# Patient Record
Sex: MALE | Race: BLACK OR AFRICAN AMERICAN
[De-identification: names, ages, dates, MRNs, and addresses within clinical notes are randomized per-mention and may not be internally consistent; named-entity substitution may affect disease eponyms.]

---

## 2019-02-25 ENCOUNTER — HOSPITAL ENCOUNTER (EMERGENCY)
Dept: HOSPITAL 35 - ER | Age: 64
Discharge: HOME | End: 2019-02-25
Payer: COMMERCIAL

## 2019-02-25 VITALS — SYSTOLIC BLOOD PRESSURE: 121 MMHG | DIASTOLIC BLOOD PRESSURE: 74 MMHG

## 2019-02-25 VITALS — BODY MASS INDEX: 31.83 KG/M2 | HEIGHT: 68 IN | WEIGHT: 210.01 LBS

## 2019-02-25 DIAGNOSIS — F03.90: ICD-10-CM

## 2019-02-25 DIAGNOSIS — F25.9: ICD-10-CM

## 2019-02-25 DIAGNOSIS — R25.1: Primary | ICD-10-CM

## 2019-02-25 DIAGNOSIS — F31.9: ICD-10-CM

## 2019-02-25 DIAGNOSIS — F17.210: ICD-10-CM

## 2019-02-25 LAB
ALBUMIN SERPL-MCNC: 3.9 G/DL (ref 3.4–5)
ALT SERPL-CCNC: 24 U/L (ref 30–65)
ANION GAP SERPL CALC-SCNC: 5 MMOL/L (ref 7–16)
AST SERPL-CCNC: 16 U/L (ref 15–37)
BASOPHILS NFR BLD AUTO: 0.8 % (ref 0–2)
BILIRUB SERPL-MCNC: 0.6 MG/DL
BILIRUB UR-MCNC: NEGATIVE MG/DL
BUN SERPL-MCNC: 21 MG/DL (ref 7–18)
CALCIUM SERPL-MCNC: 10.6 MG/DL (ref 8.5–10.1)
CHLORIDE SERPL-SCNC: 102 MMOL/L (ref 98–107)
CO2 SERPL-SCNC: 29 MMOL/L (ref 21–32)
COLOR UR: YELLOW
CREAT SERPL-MCNC: 1.8 MG/DL (ref 0.7–1.3)
EOSINOPHIL NFR BLD: 1.2 % (ref 0–3)
ERYTHROCYTE [DISTWIDTH] IN BLOOD BY AUTOMATED COUNT: 15.7 % (ref 10.5–14.5)
GLUCOSE SERPL-MCNC: 100 MG/DL (ref 74–106)
GRANULOCYTES NFR BLD MANUAL: 73 % (ref 36–66)
HCT VFR BLD CALC: 46.9 % (ref 42–52)
HGB BLD-MCNC: 14.9 GM/DL (ref 14–18)
KETONES UR STRIP-MCNC: NEGATIVE MG/DL
LYMPHOCYTES NFR BLD AUTO: 18.4 % (ref 24–44)
MCH RBC QN AUTO: 28.6 PG (ref 26–34)
MCHC RBC AUTO-ENTMCNC: 31.7 G/DL (ref 28–37)
MCV RBC: 90.1 FL (ref 80–100)
MONOCYTES NFR BLD: 6.6 % (ref 1–8)
NEUTROPHILS # BLD: 6.2 THOU/UL (ref 1.4–8.2)
PLATELET # BLD: 203 THOU/UL (ref 150–400)
POTASSIUM SERPL-SCNC: 4.3 MMOL/L (ref 3.5–5.1)
PROT SERPL-MCNC: 8.1 G/DL (ref 6.4–8.2)
RBC # BLD AUTO: 5.21 MIL/UL (ref 4.5–6)
RBC # UR STRIP: NEGATIVE /UL
SODIUM SERPL-SCNC: 136 MMOL/L (ref 136–145)
SP GR UR STRIP: 1.01 (ref 1–1.03)
TROPONIN I SERPL-MCNC: <0.06 NG/ML (ref ?–0.06)
URINE CLARITY: CLEAR
URINE GLUCOSE-RANDOM*: NEGATIVE
URINE LEUKOCYTES-REFLEX: NEGATIVE
URINE NITRITE-REFLEX: NEGATIVE
URINE PROTEIN (DIPSTICK): NEGATIVE
UROBILINOGEN UR STRIP-ACNC: 0.2 E.U./DL (ref 0.2–1)
WBC # BLD AUTO: 8.4 THOU/UL (ref 4–11)

## 2021-06-30 ENCOUNTER — HOSPITAL ENCOUNTER (EMERGENCY)
Dept: HOSPITAL 35 - ER | Age: 66
LOS: 1 days | Discharge: HOME | End: 2021-07-01
Payer: COMMERCIAL

## 2021-06-30 VITALS — BODY MASS INDEX: 26.66 KG/M2 | WEIGHT: 180.01 LBS | HEIGHT: 69 IN

## 2021-06-30 DIAGNOSIS — Z88.8: ICD-10-CM

## 2021-06-30 DIAGNOSIS — R07.89: Primary | ICD-10-CM

## 2021-06-30 DIAGNOSIS — Z79.82: ICD-10-CM

## 2021-06-30 DIAGNOSIS — Z79.899: ICD-10-CM

## 2021-06-30 DIAGNOSIS — F17.210: ICD-10-CM

## 2021-06-30 DIAGNOSIS — R07.2: ICD-10-CM

## 2021-06-30 LAB
ANION GAP SERPL CALC-SCNC: 9 MMOL/L (ref 7–16)
BASOPHILS NFR BLD AUTO: 1 % (ref 0–2)
BUN SERPL-MCNC: 12 MG/DL (ref 7–18)
CALCIUM SERPL-MCNC: 9.5 MG/DL (ref 8.5–10.1)
CHLORIDE SERPL-SCNC: 103 MMOL/L (ref 98–107)
CO2 SERPL-SCNC: 29 MMOL/L (ref 21–32)
CREAT SERPL-MCNC: 1.5 MG/DL (ref 0.7–1.3)
EOSINOPHIL NFR BLD: 2.1 % (ref 0–3)
ERYTHROCYTE [DISTWIDTH] IN BLOOD BY AUTOMATED COUNT: 16 % (ref 10.5–14.5)
GLUCOSE SERPL-MCNC: 80 MG/DL (ref 74–106)
GRANULOCYTES NFR BLD MANUAL: 51.6 % (ref 36–66)
HCT VFR BLD CALC: 46.4 % (ref 42–52)
HGB BLD-MCNC: 15.2 GM/DL (ref 14–18)
LYMPHOCYTES NFR BLD AUTO: 38.5 % (ref 24–44)
MCH RBC QN AUTO: 28.1 PG (ref 26–34)
MCHC RBC AUTO-ENTMCNC: 32.8 G/DL (ref 28–37)
MCV RBC: 85.5 FL (ref 80–100)
MONOCYTES NFR BLD: 6.8 % (ref 1–8)
NEUTROPHILS # BLD: 2.2 THOU/UL (ref 1.4–8.2)
PLATELET # BLD: 193 THOU/UL (ref 150–400)
POTASSIUM SERPL-SCNC: 4.1 MMOL/L (ref 3.5–5.1)
RBC # BLD AUTO: 5.43 MIL/UL (ref 4.5–6)
SODIUM SERPL-SCNC: 141 MMOL/L (ref 136–145)
WBC # BLD AUTO: 4.3 THOU/UL (ref 4–11)

## 2021-07-01 VITALS — DIASTOLIC BLOOD PRESSURE: 87 MMHG | SYSTOLIC BLOOD PRESSURE: 139 MMHG

## 2021-07-01 LAB
ALBUMIN SERPL-MCNC: 3.5 G/DL (ref 3.4–5)
ALT SERPL-CCNC: 18 U/L (ref 30–65)
AST SERPL-CCNC: 21 U/L (ref 15–37)
BILIRUB SERPL-MCNC: 0.3 MG/DL (ref 0.2–1)
LIPASE: 265 U/L (ref 73–393)
PROT SERPL-MCNC: 7.8 G/DL (ref 6.4–8.2)
TROPONIN I SERPL-MCNC: <0.06 NG/ML (ref ?–0.06)

## 2021-07-01 NOTE — EKG
[place] [address] [facility] [employer]
[place]  [address]
Phone:  [phone]                    ELECTROCARDIOGRAM REPORT      
_______________________________________________________________________________
 
Name:       LEEANN SANTANA III           Room #:                     [facility]#:      [id number]     Account #:      [id number]  
Admission:  21    Attend Phys:                          
Discharge:  21    Date of Birth:  [birth year]  
                                                          Report #: [id number]
   [id number]
_______________________________________________________________________________
                         [facility] ED
                                       
Test Date:    2021               Test Time:    23:35:54
Pat Name:     LEEANN SANTANA            Department:   
Patient ID:   [id number]            Room:          
Gender:       M                        Technician:   
:          1955               Requested By: Douglas Underwood
Order Number: [id number] MD:   Lebron Toure
                                 Measurements
Intervals                              Axis          
Rate:         64                       P:            70
MI:           173                      QRS:          38
QRSD:         103                      T:            53
QT:           416                                    
QTc:          430                                    
                           Interpretive Statements
Sinus rhythm
Probable left atrial enlargement
Compared to ECG 2019 07:08:38
First degree AV block no longer present
ST (T wave) deviation no longer present
Electronically Signed On 2021 7:28:02 CDT by Lebron Toure
[email or web address]
 
 
 
 
 
 
 
 
 
 
 
 
 
 
 
 
 
 
 
 
  <ELECTRONICALLY SIGNED>
   By: Lebron Toure MD, [facility]    
  21     0728
D: 215                           _____________________________________
T: 21                           Lebron Toure MD, FACC      /EPI

## 2021-07-08 ENCOUNTER — HOSPITAL ENCOUNTER (EMERGENCY)
Dept: HOSPITAL 35 - ER | Age: 66
LOS: 1 days | Discharge: HOME | End: 2021-07-09
Payer: COMMERCIAL

## 2021-07-08 VITALS — HEIGHT: 67 IN | BODY MASS INDEX: 25.9 KG/M2 | WEIGHT: 164.99 LBS

## 2021-07-08 DIAGNOSIS — Z79.82: ICD-10-CM

## 2021-07-08 DIAGNOSIS — R68.84: Primary | ICD-10-CM

## 2021-07-08 DIAGNOSIS — F17.210: ICD-10-CM

## 2021-07-08 DIAGNOSIS — K08.89: ICD-10-CM

## 2021-07-08 DIAGNOSIS — J32.9: ICD-10-CM

## 2021-07-08 DIAGNOSIS — F25.9: ICD-10-CM

## 2021-07-08 DIAGNOSIS — F31.9: ICD-10-CM

## 2021-07-08 DIAGNOSIS — Z88.8: ICD-10-CM

## 2021-07-08 DIAGNOSIS — Z79.899: ICD-10-CM

## 2021-07-09 VITALS — DIASTOLIC BLOOD PRESSURE: 83 MMHG | SYSTOLIC BLOOD PRESSURE: 145 MMHG

## 2021-08-14 ENCOUNTER — HOSPITAL ENCOUNTER (INPATIENT)
Dept: HOSPITAL 35 - ER | Age: 66
LOS: 1 days | Discharge: HOME | DRG: 395 | End: 2021-08-15
Payer: COMMERCIAL

## 2021-08-14 VITALS — DIASTOLIC BLOOD PRESSURE: 91 MMHG | SYSTOLIC BLOOD PRESSURE: 150 MMHG

## 2021-08-14 VITALS — SYSTOLIC BLOOD PRESSURE: 150 MMHG | DIASTOLIC BLOOD PRESSURE: 91 MMHG

## 2021-08-14 VITALS — DIASTOLIC BLOOD PRESSURE: 90 MMHG | SYSTOLIC BLOOD PRESSURE: 124 MMHG

## 2021-08-14 VITALS — HEIGHT: 67.01 IN | BODY MASS INDEX: 30.13 KG/M2 | WEIGHT: 192 LBS

## 2021-08-14 VITALS — DIASTOLIC BLOOD PRESSURE: 96 MMHG | SYSTOLIC BLOOD PRESSURE: 134 MMHG

## 2021-08-14 DIAGNOSIS — F03.90: ICD-10-CM
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
## 2021-08-14 DIAGNOSIS — F31.9: ICD-10-CM

## 2021-08-14 DIAGNOSIS — F17.210: ICD-10-CM
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
## 2021-08-14 DIAGNOSIS — Z88.8: ICD-10-CM

## 2021-08-14 DIAGNOSIS — Z20.822: ICD-10-CM
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
## 2021-08-14 DIAGNOSIS — F10.20: ICD-10-CM
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
## 2021-08-14 DIAGNOSIS — T18.5XXA: Primary | ICD-10-CM

## 2021-08-14 DIAGNOSIS — Z79.899: ICD-10-CM

## 2021-08-14 DIAGNOSIS — Z79.82: ICD-10-CM

## 2021-08-14 LAB
ANION GAP SERPL CALC-SCNC: 10 MMOL/L (ref 7–16)
BASOPHILS NFR BLD AUTO: 0.5 % (ref 0–2)
BUN SERPL-MCNC: 15 MG/DL (ref 7–18)
CALCIUM SERPL-MCNC: 9.2 MG/DL (ref 8.5–10.1)
CHLORIDE SERPL-SCNC: 104 MMOL/L (ref 98–107)
CO2 SERPL-SCNC: 26 MMOL/L (ref 21–32)
CREAT SERPL-MCNC: 1.4 MG/DL (ref 0.7–1.3)
EOSINOPHIL NFR BLD: 0.3 % (ref 0–3)
ERYTHROCYTE [DISTWIDTH] IN BLOOD BY AUTOMATED COUNT: 16.2 % (ref 10.5–14.5)
GLUCOSE SERPL-MCNC: 91 MG/DL (ref 74–106)
GRANULOCYTES NFR BLD MANUAL: 66.2 % (ref 36–66)
HCT VFR BLD CALC: 44.7 % (ref 42–52)
HGB BLD-MCNC: 15 GM/DL (ref 14–18)
LYMPHOCYTES NFR BLD AUTO: 26.2 % (ref 24–44)
MCH RBC QN AUTO: 28.3 PG (ref 26–34)
MCHC RBC AUTO-ENTMCNC: 33.6 G/DL (ref 28–37)
MCV RBC: 84.2 FL (ref 80–100)
MONOCYTES NFR BLD: 6.8 % (ref 1–8)
NEUTROPHILS # BLD: 3.8 THOU/UL (ref 1.4–8.2)
PLATELET # BLD: 190 THOU/UL (ref 150–400)
POTASSIUM SERPL-SCNC: 4.4 MMOL/L (ref 3.5–5.1)
RBC # BLD AUTO: 5.31 MIL/UL (ref 4.5–6)
SODIUM SERPL-SCNC: 140 MMOL/L (ref 136–145)
WBC # BLD AUTO: 5.7 THOU/UL (ref 4–11)

## 2021-08-14 PROCEDURE — 50101: CPT

## 2021-08-14 PROCEDURE — 62900: CPT

## 2021-08-14 PROCEDURE — 70005: CPT

## 2021-08-14 PROCEDURE — 62110: CPT

## 2021-08-14 PROCEDURE — 10040 EXTRACTION: CPT

## 2021-08-14 PROCEDURE — 50400 REVISION OF KIDNEY/URETER: CPT

## 2021-08-14 NOTE — O
[facility]
[address]
[place]   [address]                     OPERATIVE REPORT              
_______________________________________________________________________________
 
Name:       LEEANN SANTANA III           Room #:         459-P       [facility] [place]  
M.R.#:      [id number]                       Account #:      [id number]  
Admission:  08/14/21    Attend Phys:    Darren Carrasquillo,
Discharge:              Date of Birth:  [birth year]  
                                                          Report #: [id number]
                                                                    [id number] 
_______________________________________________________________________________
THIS REPORT FOR:  
 
cc:  [facility] physician unknown
     [facility] physician unknown                                       
     Darren Carrasquillo MD                                     ~
 
DATE OF SERVICE: 08/15/2021
 
PREOPERATIVE DIAGNOSIS:  Rectal foreign body.
 
POSTOPERATIVE DIAGNOSIS:  Rectal foreign body.
 
OPERATIONS:
1.  Rectal exam under anesthesia.
2.  Removal of rectal foreign body.
 
SURGEON:  Darren Carrasquillo M.D.
 
ANESTHESIA:  General.
 
ESTIMATED BLOOD LOSS:  Minimal.
 
SPECIMENS:  Rectal foreign body.
 
DESCRIPTION OF PROCEDURE:  After informed consent was obtained, the patient was 
brought to the operating room and placed supine.  SCDs were placed and working, 
preoperative antibiotics were not administered, general anesthesia was induced. 
The patient was placed in the lithotomy position.  The area was then prepped and
draped in the usual sterile fashion.  Digital rectal exam was performed.  This 
demonstrated an approximately 15 cm long handle of a barbecue skillet handle.  
This was removed.  Anoscopy was performed and this did not demonstrate any other
trauma.  The rectal foreign body was then sent off as a specimen per the 
hospital guidelines.
 
The patient was then awakened from anesthesia.
 
COMPLICATIONS:  None.
 
DISPOSITION:  The patient was taken to recovery in satisfactory condition.
 
 
 
 
 
                         
   By:                               
                   
D: 08/15/21 1155                           _____________________________________
T: 08/15/21 1241                           Darren Carrasquillo MD       /nt

## 2021-08-15 VITALS — DIASTOLIC BLOOD PRESSURE: 85 MMHG | SYSTOLIC BLOOD PRESSURE: 122 MMHG

## 2021-08-15 VITALS — DIASTOLIC BLOOD PRESSURE: 75 MMHG | SYSTOLIC BLOOD PRESSURE: 128 MMHG

## 2021-08-15 VITALS — DIASTOLIC BLOOD PRESSURE: 55 MMHG | SYSTOLIC BLOOD PRESSURE: 96 MMHG

## 2021-08-15 NOTE — NUR
Pt AXOX4 sitting up in chair.  Starting getting mad about 10:00 AM stating he
was going to leave AMA.  I explained we were waiting on a OR.  At 12:00 they
took him down and removed object object found out to be a door handle.  Pt ate
lunch and rested.  Then was discharged IV dced.  No c/o pain or distress
noted.

## 2021-08-15 NOTE — NUR
PT ADMITTED TO THE UNIT WITH C/O OF FOREIGN DESCRIBED AS BBQ BRUSH STUCK IN
THE RECTUN FEW REMAINING MENTAL WIRES IN RECTAL TISSUES.PT UNABLE TO GIVE A
GOOD HISTORY OF WHAT HAPPENED .PT IS A/O X4.PT C/O OF TINGLING BLE.PT DENIED
ABDOMINAL PAIN,NAUSEA AND VOMITING.PT NPO FROM MIDNIGHT .PT IS ON ROOM AIR.IV
ACCCESS ON RFA SL.PT USES A URINAL TO VOID.WILL CONTINUE TO MONITOR

## 2021-08-17 NOTE — PATH
[facility]
[address]
[place]   [address]                     PATHOLOGY RPT PROCEDURE       
_______________________________________________________________________________
 
Name:       LEEANN MARX III           Room #:         459-P       [facility] [place]  
..#:      [id number]     Account #:      [id number]  
Admission:  08/14/21    Date of Birth:  [birth year]  
Discharge:  08/15/21                                    Report #:    [id number]
                                                        Path Case #: [id number]
_______________________________________________________________________________
 
[facility] Accession Number: [id number]
.                                                                01
Material submitted:                                        .
body - FOREIGN BODY
.                                                                01
Clinical history:                                          .
HEMORRHOIDECTOMY
.                                                                02
**********************************************************************
Diagnosis:
Foreign object in rectum, removal:
- 16.9 cm metallic object (gross exam only).
(IUV:brown; 08/17/2021)
QMS  08/17/2021  1358 Local
**********************************************************************
.                                                                02
Electronically signed:                                     .
Karine Conrad MD, Pathologist
NPI- [id number]
.                                                                01
Gross description:                                         .
The specimen is received fresh designated "Leeann Marx III" and without
a specimen ID, however the demographic sheet designates the specimen as
"foreign object in rectum".  The specimen consists of a silver metallic
ovoid-shaped foreign body (16.9 x 3.5 x 2.0 cm) with red, rubbery, flat
ends and red rubbery detailing.  Extending from one end are 2 silver
metallic irregular portions of thin metal (1.5 x 0.5 x 0.1 cm and 1.4 x
0.4 x 0.1 cm).  No inscriptions are identified.  The specimen is for gross
examination only.  Gross photographs are taken. ([place]; 8/16/2021)
DKA/DKA  08/16/2021  1128 Local
.                                                                02
Pathologist provided ICD-10:
Z03.89
.                                                                02
CPT                                                        .
690579
Specimen Comment: A courtesy copy of this report has been sent to [phone]
Specimen Comment: Report sent to 
***Performed at:  01
   [facility]
   [address] [place] [address] [address], [place], [place]  [id number]
   MD Ralph Cowart MD Phone:  [phone]
***Performed at:  02
   [address]  [id number]
   MD Karine Conrad MD Phone:  [phone]